# Patient Record
Sex: FEMALE | Race: WHITE
[De-identification: names, ages, dates, MRNs, and addresses within clinical notes are randomized per-mention and may not be internally consistent; named-entity substitution may affect disease eponyms.]

---

## 2017-03-13 ENCOUNTER — HOSPITAL ENCOUNTER (OUTPATIENT)
Dept: HOSPITAL 47 - LABWHC1 | Age: 27
End: 2017-03-13
Payer: MEDICARE

## 2017-03-13 DIAGNOSIS — Z33.1: Primary | ICD-10-CM

## 2017-03-13 PROCEDURE — 36415 COLL VENOUS BLD VENIPUNCTURE: CPT

## 2017-03-13 PROCEDURE — 84702 CHORIONIC GONADOTROPIN TEST: CPT

## 2017-04-30 ENCOUNTER — HOSPITAL ENCOUNTER (EMERGENCY)
Dept: HOSPITAL 47 - EC | Age: 27
Discharge: HOME | End: 2017-04-30
Payer: MEDICARE

## 2017-04-30 VITALS
SYSTOLIC BLOOD PRESSURE: 124 MMHG | RESPIRATION RATE: 18 BRPM | TEMPERATURE: 98.3 F | HEART RATE: 68 BPM | DIASTOLIC BLOOD PRESSURE: 68 MMHG

## 2017-04-30 DIAGNOSIS — Z79.899: ICD-10-CM

## 2017-04-30 DIAGNOSIS — R10.31: Primary | ICD-10-CM

## 2017-04-30 DIAGNOSIS — Z90.49: ICD-10-CM

## 2017-04-30 DIAGNOSIS — F32.9: ICD-10-CM

## 2017-04-30 DIAGNOSIS — G90.50: ICD-10-CM

## 2017-04-30 DIAGNOSIS — R11.0: ICD-10-CM

## 2017-04-30 DIAGNOSIS — F41.9: ICD-10-CM

## 2017-04-30 DIAGNOSIS — Z79.891: ICD-10-CM

## 2017-04-30 LAB
ALP SERPL-CCNC: 52 U/L (ref 38–126)
ALT SERPL-CCNC: 35 U/L (ref 9–52)
AMYLASE SERPL-CCNC: 54 U/L (ref 30–110)
ANION GAP SERPL CALC-SCNC: 11 MMOL/L
AST SERPL-CCNC: 23 U/L (ref 14–36)
BASOPHILS # BLD AUTO: 0.1 K/UL (ref 0–0.2)
BASOPHILS NFR BLD AUTO: 1 %
BUN SERPL-SCNC: 13 MG/DL (ref 7–17)
CALCIUM SPEC-MCNC: 9.7 MG/DL (ref 8.4–10.2)
CH: 28.1
CHCM: 32.7
CHLORIDE SERPL-SCNC: 107 MMOL/L (ref 98–107)
CO2 SERPL-SCNC: 25 MMOL/L (ref 22–30)
EOSINOPHIL # BLD AUTO: 0.2 K/UL (ref 0–0.7)
EOSINOPHIL NFR BLD AUTO: 3 %
ERYTHROCYTE [DISTWIDTH] IN BLOOD BY AUTOMATED COUNT: 4.64 M/UL (ref 3.8–5.4)
ERYTHROCYTE [DISTWIDTH] IN BLOOD: 13 % (ref 11.5–15.5)
GLUCOSE SERPL-MCNC: 95 MG/DL (ref 74–99)
HCT VFR BLD AUTO: 40.1 % (ref 34–46)
HDW: 2.18
HGB BLD-MCNC: 13.5 GM/DL (ref 11.4–16)
LUC NFR BLD AUTO: 3 %
LYMPHOCYTES # SPEC AUTO: 2.4 K/UL (ref 1–4.8)
LYMPHOCYTES NFR SPEC AUTO: 37 %
MCH RBC QN AUTO: 29.2 PG (ref 25–35)
MCHC RBC AUTO-ENTMCNC: 33.7 G/DL (ref 31–37)
MCV RBC AUTO: 86.5 FL (ref 80–100)
MONOCYTES # BLD AUTO: 0.3 K/UL (ref 0–1)
MONOCYTES NFR BLD AUTO: 5 %
NEUTROPHILS # BLD AUTO: 3.4 K/UL (ref 1.3–7.7)
NEUTROPHILS NFR BLD AUTO: 52 %
NON-AFRICAN AMERICAN GFR(MDRD): >60
PARTICLE COUNT: 9375
PH UR: 5.5 [PH] (ref 5–8)
POTASSIUM SERPL-SCNC: 4.2 MMOL/L (ref 3.5–5.1)
PROT SERPL-MCNC: 7.4 G/DL (ref 6.3–8.2)
RBC UR QL: 3 /HPF (ref 0–5)
SODIUM SERPL-SCNC: 143 MMOL/L (ref 137–145)
SP GR UR: 1.02 (ref 1–1.03)
SQUAMOUS UR QL AUTO: 7 /HPF (ref 0–4)
UA BILLING (MACRO VS. MICRO): (no result)
UROBILINOGEN UR QL STRIP: <2 MG/DL (ref ?–2)
WBC # BLD AUTO: 0.17 10*3/UL
WBC # BLD AUTO: 6.7 K/UL (ref 3.8–10.6)
WBC #/AREA URNS HPF: 7 /HPF (ref 0–5)
WBC (PEROX): 7.01

## 2017-04-30 PROCEDURE — 96361 HYDRATE IV INFUSION ADD-ON: CPT

## 2017-04-30 PROCEDURE — 82150 ASSAY OF AMYLASE: CPT

## 2017-04-30 PROCEDURE — 96374 THER/PROPH/DIAG INJ IV PUSH: CPT

## 2017-04-30 PROCEDURE — 36415 COLL VENOUS BLD VENIPUNCTURE: CPT

## 2017-04-30 PROCEDURE — 83690 ASSAY OF LIPASE: CPT

## 2017-04-30 PROCEDURE — 81025 URINE PREGNANCY TEST: CPT

## 2017-04-30 PROCEDURE — 99284 EMERGENCY DEPT VISIT MOD MDM: CPT

## 2017-04-30 PROCEDURE — 81001 URINALYSIS AUTO W/SCOPE: CPT

## 2017-04-30 PROCEDURE — 80053 COMPREHEN METABOLIC PANEL: CPT

## 2017-04-30 PROCEDURE — 74177 CT ABD & PELVIS W/CONTRAST: CPT

## 2017-04-30 PROCEDURE — 87086 URINE CULTURE/COLONY COUNT: CPT

## 2017-04-30 PROCEDURE — 85025 COMPLETE CBC W/AUTO DIFF WBC: CPT

## 2017-04-30 NOTE — ED
Abdominal Pain HPI





- General


Chief Complaint: Abdominal Pain


Stated Complaint: Abd Pain


Time Seen by Provider: 04/30/17 20:40


Source: patient, RN notes reviewed


Mode of arrival: ambulatory


Limitations: no limitations





- History of Present Illness


Initial Comments: 





18-year-old female presents emergency Department chief complaint of right sided 

abdominal pain.  Patient has had this pain for the past month or so.  Patient 

states her last 2 days it seems to be increased.  Patient states she'll get 

stabbing with the pain.  Patient does admit to some nausea.  Patient denies 

eating changes in bowel or bladder habits.  Patient states that she went to her 

doctor and he said nothing was wrong.  She was at a different hospital.  On 

that nothing was wrong.  Patient states she was concerned that she should be 

evaluated.  Patient denies any recent fever, chills, shortness of breath, chest 

pain, back pain,  vomiting, numbness or tingling, dysuria or hematuria, 

constipation or diarrhea, headaches or visual changes, or any other current 

symptoms.





- Related Data


 Home Medications











 Medication  Instructions  Recorded  Confirmed


 


Cetirizine HCl [Zyrtec] 10 mg PO HS 04/30/17 04/30/17


 


Fluticasone Nasal Spray [Flonase 2 spr EA NOSTRIL DAILY PRN 04/30/17 04/30/17





Nasal Spray]   


 


Gabapentin 1,800 mg PO HS 04/30/17 04/30/17


 


Ibuprofen [Motrin] 800 mg PO Q6HR PRN 04/30/17 04/30/17


 


Multivitamins, Thera [Multivitamin 1 tab PO DAILY 04/30/17 04/30/17





(formulary)]   


 


Naltrexone HCl [Revia] 50 mg PO HS 04/30/17 04/30/17


 


Venlafaxine HCl [Effexor XR] 37.5 mg PO DAILY 04/30/17 04/30/17











 Allergies











Allergy/AdvReac Type Severity Reaction Status Date / Time


 


No Known Allergies Allergy   Verified 04/30/17 20:43














Review of Systems


ROS Statement: 


Those systems with pertinent positive or pertinent negative responses have been 

documented in the HPI.





ROS Other: All systems not noted in ROS Statement are negative.





Past Medical History


Additional Past Medical History / Comment(s): rsd/crps


History of Any Multi-Drug Resistant Organisms: None Reported


Past Surgical History: Cholecystectomy, Orthopedic Surgery


Additional Past Surgical History / Comment(s): heel surg  spinal cord stimulator


Past Psychological History: Anxiety, Depression


Smoking Status: Never smoker


Past Alcohol Use History: None Reported


Past Drug Use History: None Reported





General Exam





- General Exam Comments


Initial Comments: 





General:  The patient is awake and alert, in no distress, and does not appear 

acutely ill. 


Eye:  Pupils are equal, round.


Ears, nose, mouth and throat:  There are moist mucous membranes and no oral 

lesions. 


Neck:  The neck is supple, there is no tenderness.


Cardiovascular:  There is a regular rate and rhythm. No murmur, rub or gallop 

is appreciated.


Respiratory:  Lungs are clear to auscultation, respirations are non-labored, 

breath sounds are equal.  No wheezes, stridor, rales, or rhonchi.


Gastrointestinal:  Soft, non-distended, mild tenderness in right lower quadrant 

of the abdomen without masses or organomegaly noted. There is no rebound or 

guarding present.  No CVA tenderness. Bowel sounds are unremarkable.


Back:  There is no tenderness to palpation in the midline. There is no obvious 

deformity. No rashes noted. 


Musculoskeletal:  Normal ROM, no tenderness, There is no pedal edema. There is 

no calf tenderness or swelling. Sensation intact. Pulses equal bilaterally 2+.  


Neurological:  CN II-XII intact, There are no obvious motor or sensory 

deficits. Coordination appears grossly intact. Speech is normal.


Skin:  Skin is warm and dry and no rashes or lesions are noted. 


Psychiatric:  Cooperative, appropriate mood & affect, normal judgment.  





Limitations: no limitations





Course


 Vital Signs











  04/30/17 04/30/17





  20:33 21:20


 


Temperature 98.6 F 


 


Pulse Rate 62 


 


Respiratory 18 16





Rate  


 


Blood Pressure 118/80 


 


O2 Sat by Pulse 99 97





Oximetry  














Medical Decision Making





- Medical Decision Making





26-year-old female presents emergency 5 chief complaint abdominal pain.  This 

time patient CT is reviewed that does not show an acute process.  We discussed 

that this time patient has had pain in her one month.  We discussed that she 

needs to follow-up with her family care doctor we also did give her information 

for GI for further evaluation the pain.  We did discuss return parameters all 

patient's questions.  She stated that she understood and she is in agreement.  

All questions have been answered.  She will be discharged home.





- Lab Data


Result diagrams: 


 04/30/17 20:55





 04/30/17 20:55


 Lab Results











  04/30/17 04/30/17 04/30/17 Range/Units





  20:55 20:55 21:10 


 


WBC   6.7   (3.8-10.6)  k/uL


 


RBC   4.64   (3.80-5.40)  m/uL


 


Hgb   13.5   (11.4-16.0)  gm/dL


 


Hct   40.1   (34.0-46.0)  %


 


MCV   86.5   (80.0-100.0)  fL


 


MCH   29.2   (25.0-35.0)  pg


 


MCHC   33.7   (31.0-37.0)  g/dL


 


RDW   13.0   (11.5-15.5)  %


 


Plt Count   199   (150-450)  k/uL


 


Neutrophils %   52   %


 


Lymphocytes %   37   %


 


Monocytes %   5   %


 


Eosinophils %   3   %


 


Basophils %   1   %


 


Neutrophils #   3.4   (1.3-7.7)  k/uL


 


Lymphocytes #   2.4   (1.0-4.8)  k/uL


 


Monocytes #   0.3   (0-1.0)  k/uL


 


Eosinophils #   0.2   (0-0.7)  k/uL


 


Basophils #   0.1   (0-0.2)  k/uL


 


Sodium  143    (137-145)  mmol/L


 


Potassium  4.2    (3.5-5.1)  mmol/L


 


Chloride  107    ()  mmol/L


 


Carbon Dioxide  25    (22-30)  mmol/L


 


Anion Gap  11    mmol/L


 


BUN  13    (7-17)  mg/dL


 


Creatinine  0.70    (0.52-1.04)  mg/dL


 


Est GFR (MDRD) Af Amer  >60    (>60 ml/min/1.73 sqM)  


 


Est GFR (MDRD) Non-Af  >60    (>60 ml/min/1.73 sqM)  


 


Glucose  95    (74-99)  mg/dL


 


Calcium  9.7    (8.4-10.2)  mg/dL


 


Total Bilirubin  0.3    (0.2-1.3)  mg/dL


 


AST  23    (14-36)  U/L


 


ALT  35    (9-52)  U/L


 


Alkaline Phosphatase  52    ()  U/L


 


Total Protein  7.4    (6.3-8.2)  g/dL


 


Albumin  4.5    (3.5-5.0)  g/dL


 


Amylase  54    ()  U/L


 


Lipase  121    ()  U/L


 


Urine Color     


 


Urine Appearance     (Clear)  


 


Urine pH     (5.0-8.0)  


 


Ur Specific Gravity     (1.001-1.035)  


 


Urine Protein     (Negative)  


 


Urine Glucose (UA)     (Negative)  


 


Urine Ketones     (Negative)  


 


Urine Blood     (Negative)  


 


Urine Nitrite     (Negative)  


 


Urine Bilirubin     (Negative)  


 


Urine Urobilinogen     (<2.0)  mg/dL


 


Ur Leukocyte Esterase     (Negative)  


 


Urine RBC     (0-5)  /hpf


 


Urine WBC     (0-5)  /hpf


 


Ur Squamous Epith Cells     (0-4)  /hpf


 


Urine Mucus     (None)  /hpf


 


Urine HCG, Qual    Not Detected  (Not Detectd)  














  04/30/17 Range/Units





  21:10 


 


WBC   (3.8-10.6)  k/uL


 


RBC   (3.80-5.40)  m/uL


 


Hgb   (11.4-16.0)  gm/dL


 


Hct   (34.0-46.0)  %


 


MCV   (80.0-100.0)  fL


 


MCH   (25.0-35.0)  pg


 


MCHC   (31.0-37.0)  g/dL


 


RDW   (11.5-15.5)  %


 


Plt Count   (150-450)  k/uL


 


Neutrophils %   %


 


Lymphocytes %   %


 


Monocytes %   %


 


Eosinophils %   %


 


Basophils %   %


 


Neutrophils #   (1.3-7.7)  k/uL


 


Lymphocytes #   (1.0-4.8)  k/uL


 


Monocytes #   (0-1.0)  k/uL


 


Eosinophils #   (0-0.7)  k/uL


 


Basophils #   (0-0.2)  k/uL


 


Sodium   (137-145)  mmol/L


 


Potassium   (3.5-5.1)  mmol/L


 


Chloride   ()  mmol/L


 


Carbon Dioxide   (22-30)  mmol/L


 


Anion Gap   mmol/L


 


BUN   (7-17)  mg/dL


 


Creatinine   (0.52-1.04)  mg/dL


 


Est GFR (MDRD) Af Amer   (>60 ml/min/1.73 sqM)  


 


Est GFR (MDRD) Non-Af   (>60 ml/min/1.73 sqM)  


 


Glucose   (74-99)  mg/dL


 


Calcium   (8.4-10.2)  mg/dL


 


Total Bilirubin   (0.2-1.3)  mg/dL


 


AST   (14-36)  U/L


 


ALT   (9-52)  U/L


 


Alkaline Phosphatase   ()  U/L


 


Total Protein   (6.3-8.2)  g/dL


 


Albumin   (3.5-5.0)  g/dL


 


Amylase   ()  U/L


 


Lipase   ()  U/L


 


Urine Color  Yellow  


 


Urine Appearance  Cloudy H  (Clear)  


 


Urine pH  5.5  (5.0-8.0)  


 


Ur Specific Gravity  1.023  (1.001-1.035)  


 


Urine Protein  Negative  (Negative)  


 


Urine Glucose (UA)  Negative  (Negative)  


 


Urine Ketones  Negative  (Negative)  


 


Urine Blood  Negative  (Negative)  


 


Urine Nitrite  Negative  (Negative)  


 


Urine Bilirubin  Negative  (Negative)  


 


Urine Urobilinogen  <2.0  (<2.0)  mg/dL


 


Ur Leukocyte Esterase  Moderate H  (Negative)  


 


Urine RBC  3  (0-5)  /hpf


 


Urine WBC  7 H  (0-5)  /hpf


 


Ur Squamous Epith Cells  7 H  (0-4)  /hpf


 


Urine Mucus  Few H  (None)  /hpf


 


Urine HCG, Qual   (Not Detectd)  














Disposition


Clinical Impression: 


 Abdominal pain





Disposition: HOME SELF-CARE


Condition: Stable


Instructions:  Abdominal Pain (ED)


Additional Instructions: 


Please use medication as discussed. Please follow up with family doctor if 

symptoms have not improved over the next two days. Please return to the 

emergency room if your symptoms increase or worsen or for any other concerns. 


Referrals: 


Efrain Mejia MD [Primary Care Provider] - 1-2 days


Efrain De Los Santos MD [STAFF PHYSICIAN] - 1-2 days


Time of Disposition: 22:13

## 2017-04-30 NOTE — CT
EXAMINATION TYPE: CT abdomen pelvis w con

 

DATE OF EXAM: 4/30/2017 10:00 PM

 

HISTORY: Generalized abdominal pain, nausea and decreased appetite x 1 month.

 

CT DLP: 954.40mGycm  Automated Exposure Control for Dose Reduction was Utilized.

 

CONTRAST: 

CT scan of the abdomen and pelvis is performed without oral but with IV Contrast, patient injected wi
th 100 mL of Omnipaque 300.

 

COMPARISON: None.

 

FINDINGS:

 

LUNG BASES: Dependent atelectasis in both bases is present.

 

LIVER/GB: Cholecystectomy clips are noted. PANCREAS: No significant abnormality is seen.

 

SPLEEN: No significant abnormality is seen.

 

ADRENALS: No significant abnormality is seen.

 

KIDNEYS: No significant abnormality is seen.

 

BOWEL: Evaluation bowel is slightly suboptimal secondary to lack of enteric contrast. There is no catherine
picious small or large bowel dilatation seen. Appendix is felt within normal limits ascending from th
e cecum. No surrounding inflammatory change is seen.

 

UTERUS/ADNEXA: Single left-sided pelvic phlebolith is seen on axial image 78.

 

LYMPH NODES: No greater than 1cm abdominal or pelvic lymph nodes are appreciated.

 

OSSEOUS STRUCTURES: A spinal stimulator device is noted entering the lumbar spinal canal ascending to
 the lower thoracic level.

 

 OTHER: No significant additional abnormality is seen.

 

IMPRESSION: No significant acute finding is seen to account for patient's clinical symptoms.

## 2021-06-23 ENCOUNTER — HOSPITAL ENCOUNTER (OUTPATIENT)
Dept: HOSPITAL 47 - RADXRYALE | Age: 31
Discharge: HOME | End: 2021-06-23
Attending: PHYSICIAN ASSISTANT
Payer: COMMERCIAL

## 2021-06-23 DIAGNOSIS — S69.92XA: Primary | ICD-10-CM

## 2021-06-23 NOTE — XR
EXAMINATION TYPE: XR finger LT

 

DATE OF EXAM: 6/23/2021

 

COMPARISON: None

 

HISTORY: Index finger injury with hammer

 

TECHNIQUE: 2 view left index finger

 

FINDINGS: No acute fractures or dislocations are evident. Soft tissues are normal. Joint spaces are p
reserved.

 

IMPRESSION:

1.  Normal 2 view left index finger.

2. Follow up exams can be performed 7-10 days from acute trauma for continued pain.

## 2021-07-23 ENCOUNTER — HOSPITAL ENCOUNTER (OUTPATIENT)
Dept: HOSPITAL 47 - LABWHC1 | Age: 31
Discharge: HOME | End: 2021-07-23
Attending: PAIN MEDICINE
Payer: COMMERCIAL

## 2021-07-23 DIAGNOSIS — Z79.891: ICD-10-CM

## 2021-07-23 DIAGNOSIS — M79.671: ICD-10-CM

## 2021-07-23 DIAGNOSIS — G90.521: ICD-10-CM

## 2021-07-23 DIAGNOSIS — Z01.812: Primary | ICD-10-CM

## 2021-07-23 LAB — HBA1C MFR BLD: 5.2 % (ref 4–6)

## 2021-07-23 PROCEDURE — 84134 ASSAY OF PREALBUMIN: CPT

## 2021-07-23 PROCEDURE — 87070 CULTURE OTHR SPECIMN AEROBIC: CPT

## 2021-07-23 PROCEDURE — 36415 COLL VENOUS BLD VENIPUNCTURE: CPT

## 2021-07-23 PROCEDURE — 83036 HEMOGLOBIN GLYCOSYLATED A1C: CPT
